# Patient Record
Sex: MALE | Race: WHITE | NOT HISPANIC OR LATINO
[De-identification: names, ages, dates, MRNs, and addresses within clinical notes are randomized per-mention and may not be internally consistent; named-entity substitution may affect disease eponyms.]

---

## 2023-04-13 ENCOUNTER — FORM ENCOUNTER (OUTPATIENT)
Age: 73
End: 2023-04-13

## 2023-04-13 ENCOUNTER — APPOINTMENT (OUTPATIENT)
Dept: ORTHOPEDIC SURGERY | Facility: CLINIC | Age: 73
End: 2023-04-13
Payer: MEDICARE

## 2023-04-13 ENCOUNTER — APPOINTMENT (OUTPATIENT)
Dept: ORTHOPEDIC SURGERY | Facility: CLINIC | Age: 73
End: 2023-04-13

## 2023-04-13 DIAGNOSIS — Z78.9 OTHER SPECIFIED HEALTH STATUS: ICD-10-CM

## 2023-04-13 DIAGNOSIS — S93.409A SPRAIN OF UNSPECIFIED LIGAMENT OF UNSPECIFIED ANKLE, INITIAL ENCOUNTER: ICD-10-CM

## 2023-04-13 PROCEDURE — L4361: CPT | Mod: KX,LT

## 2023-04-13 PROCEDURE — 73610 X-RAY EXAM OF ANKLE: CPT | Mod: LT

## 2023-04-13 PROCEDURE — 99203 OFFICE O/P NEW LOW 30 MIN: CPT

## 2023-04-13 NOTE — DISCUSSION/SUMMARY
[de-identified] : MRI L ankle r/o post tib tendon tear\par MRI L lower leg r/o gastroc tearing\par Icing, elevating\par WBAT in cam boot\par fu after imaging \par \par Instructions: Entered by Mayra LOYD acting as scribe.\par Dr. Mart-\par The documentation recorded by the scribe accurately reflects the service I personally performed and the decisions made by me.\par

## 2023-04-13 NOTE — PHYSICAL EXAM
[Left] : left foot and ankle [Moderate] : moderate swelling of medial ankle [NL (40)] : plantar flexion 40 degrees [4___] : inversion 4[unfilled]/5 [5___] : eversion 5[unfilled]/5 [2+] : dorsalis pedis pulse: 2+ [] : antalgic [FreeTextEntry8] : ttp medial belly of gastroc with palpable 1cm hematoma? [de-identified] : inversion 25 degrees [de-identified] : eversion 15 degrees [TWNoteComboBox7] : dorsiflexion 15 degrees

## 2023-04-13 NOTE — HISTORY OF PRESENT ILLNESS
[5] : 5 [3] : 3 [Dull/Aching] : dull/aching [Intermittent] : intermittent [Household chores] : household chores [Leisure] : leisure [Nothing helps with pain getting better] : Nothing helps with pain getting better [Standing] : standing [Walking] : walking [de-identified] : 4/13/23: Pt is a 72 year old M who presents today for evaluation of their left ankle. Pt states on 4/10/23, misstepped while carrying two 5 gallon waterjugs on uneven ground .There is swelling and pain localized along the medial and radiates into shin.  No numbness/tingling. Ice to affected area.No formal treatment to date. WB in sneakers. Prior left ankle fracture treated in a cast. [] : Post Surgical Visit: no [FreeTextEntry1] : L ankle [FreeTextEntry7] : left shin

## 2023-04-14 ENCOUNTER — APPOINTMENT (OUTPATIENT)
Dept: MRI IMAGING | Facility: CLINIC | Age: 73
End: 2023-04-14
Payer: MEDICARE

## 2023-04-14 PROCEDURE — 73718 MRI LOWER EXTREMITY W/O DYE: CPT | Mod: LT,MH

## 2023-04-14 PROCEDURE — 73721 MRI JNT OF LWR EXTRE W/O DYE: CPT | Mod: LT,MH

## 2023-04-20 ENCOUNTER — APPOINTMENT (OUTPATIENT)
Dept: ORTHOPEDIC SURGERY | Facility: CLINIC | Age: 73
End: 2023-04-20
Payer: MEDICARE

## 2023-04-20 PROCEDURE — 99214 OFFICE O/P EST MOD 30 MIN: CPT

## 2023-04-20 NOTE — HISTORY OF PRESENT ILLNESS
[5] : 5 [3] : 3 [Dull/Aching] : dull/aching [Intermittent] : intermittent [Household chores] : household chores [Leisure] : leisure [Nothing helps with pain getting better] : Nothing helps with pain getting better [Standing] : standing [Walking] : walking [de-identified] : 4/13/23: Pt is a 72 year old M who presents today for evaluation of their left ankle. Pt states on 4/10/23, misstepped while carrying two 5 gallon waterjugs on uneven ground .There is swelling and pain localized along the medial and radiates into shin.  No numbness/tingling. Ice to affected area.No formal treatment to date. WB in sneakers. Prior left ankle fracture treated in a cast.\par 4/20/23  f/u l ankle.  Pain and swelling improving [] : Post Surgical Visit: no [FreeTextEntry1] : L ankle [FreeTextEntry5] : Charles is a 72 year M, Here for MRI Results [FreeTextEntry7] : left shin

## 2023-04-20 NOTE — DATA REVIEWED
[Left] : left [Lower Extremities] : lower extremities [MRI] : MRI [Ankle] : ankle [I independently reviewed and interpreted images and report] : I independently reviewed and interpreted images and report [I reviewed the films/CD and agree] : I reviewed the films/CD and agree [FreeTextEntry1] : Impression: 1. Moderate subcutaneous edema and swelling surrounding the mid to distal left lower extremity most  prominent anteriorly without acute fracture or malalignment. 2. No acute muscle tear surrounding the visualized tibia and fibula. 3. Kager's fat pad bursitis and soft tissue swelling in the visualized ankle. Please see MRI of the left ankle  performed on the same date and dictated under separate report. 4. Patient motion degrades image quality on multiple imaging sequences. [FreeTextEntry2] : Impression:  1. Posterior tibial tendinitis without tear. 2. Nonspecific moderate soft tissue swelling and edema surrounding the ankle and moderate Kager's fat pad  bursitis with mild Achilles tendinopathy without tear. 3. Mild sprains of the anterior talofibular, calcaneofibular, and deltoid ligaments with mild tibiotalar effusion  and synovitis. 4. Mild plantar fascial thickening without tear. 5. No malalignment, osteochondral lesion or loose body. 6. There is an os trigonum measuring 1.5 cm without marrow edema. 7. Clinical correlation and follow up is recommended. 8. Please see MRI of the left tib and fibula performed on the same date and dictated under separate report.

## 2023-04-20 NOTE — PHYSICAL EXAM
[Left] : left foot and ankle [Moderate] : moderate swelling of medial ankle [NL (40)] : plantar flexion 40 degrees [4___] : inversion 4[unfilled]/5 [5___] : eversion 5[unfilled]/5 [2+] : dorsalis pedis pulse: 2+ [] : no achilles tendon insertion tenderness [FreeTextEntry8] : ttp medial belly of gastroc with palpable 1cm hematoma? [de-identified] : inversion 25 degrees [de-identified] : eversion 15 degrees [TWNoteComboBox7] : dorsiflexion 15 degrees

## 2023-06-01 ENCOUNTER — APPOINTMENT (OUTPATIENT)
Dept: ORTHOPEDIC SURGERY | Facility: CLINIC | Age: 73
End: 2023-06-01
Payer: MEDICARE

## 2023-06-01 VITALS — HEIGHT: 72 IN | BODY MASS INDEX: 24.38 KG/M2 | WEIGHT: 180 LBS

## 2023-06-01 DIAGNOSIS — M76.822 POSTERIOR TIBIAL TENDINITIS, LEFT LEG: ICD-10-CM

## 2023-06-01 PROCEDURE — 99212 OFFICE O/P EST SF 10 MIN: CPT

## 2023-06-01 NOTE — HISTORY OF PRESENT ILLNESS
[1] : 2 [Dull/Aching] : dull/aching [Intermittent] : intermittent [Household chores] : household chores [Leisure] : leisure [Nothing helps with pain getting better] : Nothing helps with pain getting better [Standing] : standing [Walking] : walking [de-identified] : 4/13/23: Pt is a 72 year old M who presents today for evaluation of their left ankle. Pt states on 4/10/23, misstepped while carrying two 5 gallon waterjugs on uneven ground .There is swelling and pain localized along the medial and radiates into shin.  No numbness/tingling. Ice to affected area.No formal treatment to date. WB in sneakers. Prior left ankle fracture treated in a cast.\par 4/20/23  f/u l ankle.  Pain and swelling improving\par \par 06/01/2023: following up on left TIB/FIB. Pain has improved.HEP [] : Post Surgical Visit: no [FreeTextEntry1] : L ankle [FreeTextEntry5] : Charles is a 72 year M, Here for MRI Results [FreeTextEntry7] : left shin

## 2023-06-01 NOTE — PHYSICAL EXAM
[Left] : left foot and ankle [NL (40)] : plantar flexion 40 degrees [4___] : inversion 4[unfilled]/5 [5___] : eversion 5[unfilled]/5 [2+] : dorsalis pedis pulse: 2+ [] : able to perform single heel raise [de-identified] : balance [de-identified] : inversion 25 degrees [de-identified] : eversion 15 degrees [TWNoteComboBox7] : dorsiflexion 15 degrees